# Patient Record
Sex: FEMALE | Race: WHITE
[De-identification: names, ages, dates, MRNs, and addresses within clinical notes are randomized per-mention and may not be internally consistent; named-entity substitution may affect disease eponyms.]

---

## 2020-02-03 ENCOUNTER — HOSPITAL ENCOUNTER (INPATIENT)
Dept: HOSPITAL 93 - ER | Age: 60
LOS: 3 days | Discharge: HOME HEALTH | DRG: 392 | End: 2020-02-06
Attending: INTERNAL MEDICINE | Admitting: INTERNAL MEDICINE
Payer: COMMERCIAL

## 2020-02-03 VITALS — WEIGHT: 11.02 LBS | HEIGHT: 72 IN | BODY MASS INDEX: 1.49 KG/M2

## 2020-02-03 DIAGNOSIS — K57.32: Primary | ICD-10-CM

## 2020-02-03 DIAGNOSIS — E03.8: ICD-10-CM

## 2020-02-03 DIAGNOSIS — Z79.4: ICD-10-CM

## 2020-02-03 DIAGNOSIS — R10.32: ICD-10-CM

## 2020-02-03 DIAGNOSIS — E11.9: ICD-10-CM

## 2020-02-03 DIAGNOSIS — N20.0: ICD-10-CM

## 2020-02-03 DIAGNOSIS — J45.31: ICD-10-CM

## 2020-02-03 PROCEDURE — BW21ZZZ COMPUTERIZED TOMOGRAPHY (CT SCAN) OF ABDOMEN AND PELVIS: ICD-10-PCS | Performed by: GENERAL PRACTICE

## 2020-02-03 NOTE — NUR
FEMINA DE 59 ANOS,ALERTA,ORIENTADA EN [place] YVETTE ESFERAS EN COMPANIA DE FAMILIAR.
REFIERE DOLOR ABDOMINAL,VIENTRE BAJO DE EUCEDA DE EVOLUCION.

## 2020-02-03 NOTE — NUR
PACIENTE REFIERE PICOR EN BRAZO LT DONDE ESTABA RECIBIENDO CIPRO IV, SE
OBSERVAN LAS VENAS DEL BRAZO LT DE LA O, CIPRO YA HABIA TERMINADO DE BAJAR, SE
NOTIFICA A DR RIGGS QUIEN ORDENA ADMINISTRAR SOLUMEDROL 125 MG IV Y BENADRYL 50
MG IV.

## 2020-02-03 NOTE — NUR
SE EDUCA A PTE SOBRE TX MEDICO ESTA REFIERE ENTENDER. SE CHRIS MUESTRAS DE LAB
UTILIZANDO MEDIDAS ASEPTICAS. SE COLOCA H/L A PTE EL CUAL SE ENCUENTRA PATENTE
MARIVEL DE EDEMA Y ENROJECIMIENTO. SE NOTIFICA ESTUDIO DE CT PENDIENTE A
REALIZAR.  PTE SE CONTINUA MONITORIANDO POR CAMBIOS EN DIEGO CONDICION.

## 2020-03-11 ENCOUNTER — HOSPITAL ENCOUNTER (OUTPATIENT)
Dept: HOSPITAL 93 - LAB | Age: 60
Discharge: HOME | End: 2020-03-11
Attending: INTERNAL MEDICINE
Payer: COMMERCIAL

## 2020-03-11 DIAGNOSIS — N39.0: Primary | ICD-10-CM

## 2021-02-23 ENCOUNTER — HOSPITAL ENCOUNTER (OUTPATIENT)
Dept: HOSPITAL 93 - LAB | Age: 61
Discharge: HOME | End: 2021-02-23
Attending: INTERNAL MEDICINE
Payer: COMMERCIAL

## 2021-02-23 DIAGNOSIS — N39.0: Primary | ICD-10-CM

## 2021-02-23 DIAGNOSIS — R10.32: ICD-10-CM

## 2021-03-17 ENCOUNTER — HOSPITAL ENCOUNTER (OUTPATIENT)
Dept: HOSPITAL 93 - LAB | Age: 61
Discharge: HOME | End: 2021-03-17
Attending: INTERNAL MEDICINE
Payer: COMMERCIAL

## 2021-03-17 DIAGNOSIS — N39.0: Primary | ICD-10-CM

## 2021-03-23 ENCOUNTER — HOSPITAL ENCOUNTER (OUTPATIENT)
Dept: HOSPITAL 93 - AMB-ENDOS | Age: 61
Discharge: HOME | End: 2021-03-23
Attending: COLON & RECTAL SURGERY
Payer: COMMERCIAL

## 2021-03-23 DIAGNOSIS — Z20.822: ICD-10-CM

## 2021-03-23 DIAGNOSIS — D12.0: Primary | ICD-10-CM

## 2021-03-23 DIAGNOSIS — D12.2: ICD-10-CM

## 2021-03-23 DIAGNOSIS — D12.4: ICD-10-CM

## 2021-04-12 ENCOUNTER — HOSPITAL ENCOUNTER (OUTPATIENT)
Dept: HOSPITAL 93 - LAB | Age: 61
Discharge: HOME | End: 2021-04-12
Attending: INTERNAL MEDICINE
Payer: COMMERCIAL

## 2021-04-12 DIAGNOSIS — N39.0: Primary | ICD-10-CM

## 2021-04-12 DIAGNOSIS — R10.32: ICD-10-CM

## 2021-04-12 DIAGNOSIS — R10.84: ICD-10-CM

## 2021-05-10 ENCOUNTER — HOSPITAL ENCOUNTER (INPATIENT)
Dept: HOSPITAL 93 - O/R | Age: 61
LOS: 10 days | Discharge: HOME | DRG: 331 | End: 2021-05-20
Attending: COLON & RECTAL SURGERY | Admitting: COLON & RECTAL SURGERY
Payer: COMMERCIAL

## 2021-05-10 VITALS — HEIGHT: 67 IN | WEIGHT: 168 LBS | BODY MASS INDEX: 26.37 KG/M2

## 2021-05-10 DIAGNOSIS — E03.8: ICD-10-CM

## 2021-05-10 DIAGNOSIS — K57.32: Primary | ICD-10-CM

## 2021-05-10 DIAGNOSIS — D12.2: ICD-10-CM

## 2021-05-10 DIAGNOSIS — D12.4: ICD-10-CM

## 2021-05-10 DIAGNOSIS — I10: ICD-10-CM

## 2021-05-10 DIAGNOSIS — E11.9: ICD-10-CM

## 2021-05-17 PROCEDURE — 0DTP4ZZ RESECTION OF RECTUM, PERCUTANEOUS ENDOSCOPIC APPROACH: ICD-10-PCS | Performed by: COLON & RECTAL SURGERY

## 2021-05-17 PROCEDURE — 0DTN4ZZ RESECTION OF SIGMOID COLON, PERCUTANEOUS ENDOSCOPIC APPROACH: ICD-10-PCS | Performed by: COLON & RECTAL SURGERY

## 2021-08-24 ENCOUNTER — HOSPITAL ENCOUNTER (OUTPATIENT)
Dept: HOSPITAL 93 - LAB | Age: 61
Discharge: HOME | End: 2021-08-24
Attending: INTERNAL MEDICINE
Payer: COMMERCIAL

## 2021-08-24 DIAGNOSIS — R10.32: Primary | ICD-10-CM

## 2021-08-24 DIAGNOSIS — N35.028: ICD-10-CM

## 2022-05-04 ENCOUNTER — HOSPITAL ENCOUNTER (OUTPATIENT)
Dept: HOSPITAL 93 - RAD | Age: 62
Discharge: HOME | End: 2022-05-04
Attending: INTERNAL MEDICINE
Payer: COMMERCIAL

## 2022-05-04 ENCOUNTER — HOSPITAL ENCOUNTER (OUTPATIENT)
Dept: HOSPITAL 93 - LAB | Age: 62
Discharge: HOME | End: 2022-05-04
Attending: INTERNAL MEDICINE
Payer: COMMERCIAL

## 2022-05-04 DIAGNOSIS — E03.9: ICD-10-CM

## 2022-05-04 DIAGNOSIS — R10.84: ICD-10-CM

## 2022-05-04 DIAGNOSIS — M13.80: Primary | ICD-10-CM

## 2022-05-04 DIAGNOSIS — N39.0: ICD-10-CM

## 2022-05-04 DIAGNOSIS — M77.12: Primary | ICD-10-CM
